# Patient Record
Sex: MALE | Race: WHITE | Employment: FULL TIME | URBAN - METROPOLITAN AREA
[De-identification: names, ages, dates, MRNs, and addresses within clinical notes are randomized per-mention and may not be internally consistent; named-entity substitution may affect disease eponyms.]

---

## 2019-07-20 ENCOUNTER — APPOINTMENT (OUTPATIENT)
Dept: GENERAL RADIOLOGY | Age: 24
End: 2019-07-20
Payer: COMMERCIAL

## 2019-07-20 ENCOUNTER — HOSPITAL ENCOUNTER (EMERGENCY)
Age: 24
Discharge: HOME | End: 2019-07-21
Attending: EMERGENCY MEDICINE
Payer: COMMERCIAL

## 2019-07-20 ENCOUNTER — APPOINTMENT (OUTPATIENT)
Dept: CT IMAGING | Age: 24
End: 2019-07-20
Payer: COMMERCIAL

## 2019-07-20 VITALS
TEMPERATURE: 100.3 F | HEART RATE: 68 BPM | HEIGHT: 72 IN | RESPIRATION RATE: 18 BRPM | DIASTOLIC BLOOD PRESSURE: 71 MMHG | SYSTOLIC BLOOD PRESSURE: 133 MMHG | WEIGHT: 246.6 LBS | OXYGEN SATURATION: 96 % | BODY MASS INDEX: 33.4 KG/M2

## 2019-07-20 DIAGNOSIS — S09.90XA INJURY OF HEAD, INITIAL ENCOUNTER: Primary | ICD-10-CM

## 2019-07-20 PROCEDURE — 72040 X-RAY EXAM NECK SPINE 2-3 VW: CPT

## 2019-07-20 PROCEDURE — 99283 EMERGENCY DEPT VISIT LOW MDM: CPT

## 2019-07-20 ASSESSMENT — PAIN DESCRIPTION - ONSET: ONSET: ON-GOING

## 2019-07-20 ASSESSMENT — PAIN DESCRIPTION - LOCATION: LOCATION: HEAD

## 2019-07-20 ASSESSMENT — PAIN DESCRIPTION - DESCRIPTORS: DESCRIPTORS: HEADACHE

## 2019-07-20 ASSESSMENT — PAIN DESCRIPTION - PROGRESSION: CLINICAL_PROGRESSION: NOT CHANGED

## 2019-07-20 ASSESSMENT — PAIN SCALES - GENERAL: PAINLEVEL_OUTOF10: 2

## 2019-07-20 ASSESSMENT — PAIN DESCRIPTION - FREQUENCY: FREQUENCY: CONTINUOUS

## 2019-07-20 ASSESSMENT — PAIN DESCRIPTION - PAIN TYPE: TYPE: ACUTE PAIN

## 2019-07-21 ENCOUNTER — APPOINTMENT (OUTPATIENT)
Dept: CT IMAGING | Age: 24
End: 2019-07-21
Payer: COMMERCIAL

## 2019-07-21 PROCEDURE — 70450 CT HEAD/BRAIN W/O DYE: CPT

## 2019-07-21 RX ORDER — ONDANSETRON 4 MG/1
4 TABLET, ORALLY DISINTEGRATING ORAL EVERY 8 HOURS PRN
Qty: 20 TABLET | Refills: 0 | Status: SHIPPED | OUTPATIENT
Start: 2019-07-21

## 2019-07-21 ASSESSMENT — ENCOUNTER SYMPTOMS
TROUBLE SWALLOWING: 0
EYE PAIN: 0
BACK PAIN: 0
COLOR CHANGE: 0
VOMITING: 0
SORE THROAT: 0
ABDOMINAL PAIN: 0
NAUSEA: 1
SHORTNESS OF BREATH: 0
FACIAL SWELLING: 0
VOICE CHANGE: 0
PHOTOPHOBIA: 0

## 2019-07-21 NOTE — ED PROVIDER NOTES
[facility] [address] ED  eMERGENCY dEPARTMENT eNCOUnter      Pt Name: Liam Reyes  MRN: [id number]  [place] 1995  Date of evaluation: 7/20/2019  Provider: FLORINA Saenz CNP    CHIEF COMPLAINT       Chief Complaint   Patient presents with    Loss of Consciousness     pt states he fell backwards during a hockey game warmup & hit his head on the ground tonight. Pt states he was told he was \"out\" for about 5 seconds.  Head Injury    Nausea    Dizziness    Headache         HISTORY OF PRESENT ILLNESS  (Location/Symptom, Timing/Onset, Context/Setting, Quality, Duration, Modifying Factors, Severity.)   Liam Reyes is a 24 y.o. male who presents to the emergency department via private auto for a head injury. Reports he ran into his friend while warming up for a hockey game. He fell backwards and struck his head. He lost consciousness for a few seconds. Reports dizziness, nausea. He was wearing a helmet. Denies pain to his neck, back, chest, abdomen. Denies weakness, N/T to his extremities, vomiting, vision changes. He is accompanied by family/friend. Rates his pain 2/10 at this time. Nursing Notes were reviewed. ALLERGIES     Patient has no known allergies. CURRENT MEDICATIONS       Previous Medications    No medications on file       PAST MEDICAL HISTORY   No past medical history on file. SURGICAL HISTORY     No past surgical history on file. FAMILY HISTORY     No family history on file. No family status information on file. SOCIAL HISTORY          REVIEW OF SYSTEMS    (2-9 systems for level 4, 10 or more for level 5)     Review of Systems   Constitutional: Negative for chills, diaphoresis, fatigue and fever. HENT: Negative for dental problem, drooling, ear discharge, ear pain, facial swelling, nosebleeds, sore throat, trouble swallowing and voice change. Eyes: Negative for photophobia, pain and visual disturbance.    Respiratory: Negative for shortness of breath. Cardiovascular: Negative for chest pain. Gastrointestinal: Positive for nausea. Negative for abdominal pain and vomiting. Musculoskeletal: Negative for arthralgias, back pain, gait problem, neck pain and neck stiffness. Skin: Negative for color change, rash and wound. Neurological: Positive for syncope and headaches. Negative for dizziness, facial asymmetry, speech difficulty, weakness, light-headedness and numbness. Psychiatric/Behavioral: Negative for confusion. Except as noted above the remainder of the review of systems was reviewed and negative. PHYSICAL EXAM    (up to 7 for level 4, 8 or more for level 5)     ED Triage Vitals [07/20/19 2314]   BP Temp Temp Source Pulse Resp SpO2 Height Weight   133/71 100.3 °F (37.9 °C) Oral 68 18 96 % 6' (1.829 m) 246 lb 9.6 oz (111.9 kg)     Physical Exam   Constitutional: He is oriented to person, place, and time. He appears well-developed and well-nourished. No distress. HENT:   Head: Atraumatic. Head is without raccoon's eyes and without Neves's sign. Right Ear: External ear normal. No drainage. Left Ear: External ear normal. No drainage. Nose: No sinus tenderness. No epistaxis. Mouth/Throat: Oropharynx is clear and moist.   Eyes: Pupils are equal, round, and reactive to light. Conjunctivae and EOM are normal.   Cardiovascular: Normal rate, regular rhythm and normal heart sounds. Pulmonary/Chest: Effort normal and breath sounds normal. No respiratory distress. He has no wheezes. He has no rales. Musculoskeletal:        Cervical back: He exhibits no tenderness, no bony tenderness and no pain. Thoracic back: He exhibits no tenderness, no bony tenderness and no pain. Lumbar back: He exhibits no tenderness, no bony tenderness and no pain. Neurological: He is alert and oriented to person, place, and time. He has normal strength. No cranial nerve deficit. Coordination and gait normal. GCS eye subscore is 4.  GCS verbal

## 2020-02-01 ENCOUNTER — HOSPITAL ENCOUNTER (EMERGENCY)
Age: 25
Discharge: HOME | End: 2020-02-01
Attending: EMERGENCY MEDICINE
Payer: COMMERCIAL

## 2020-02-01 ENCOUNTER — APPOINTMENT (OUTPATIENT)
Dept: GENERAL RADIOLOGY | Age: 25
End: 2020-02-01
Payer: COMMERCIAL

## 2020-02-01 VITALS
DIASTOLIC BLOOD PRESSURE: 87 MMHG | RESPIRATION RATE: 16 BRPM | HEART RATE: 57 BPM | BODY MASS INDEX: 32.51 KG/M2 | WEIGHT: 240 LBS | OXYGEN SATURATION: 97 % | TEMPERATURE: 97.7 F | HEIGHT: 72 IN | SYSTOLIC BLOOD PRESSURE: 151 MMHG

## 2020-02-01 PROCEDURE — 71046 X-RAY EXAM CHEST 2 VIEWS: CPT

## 2020-02-01 PROCEDURE — 99283 EMERGENCY DEPT VISIT LOW MDM: CPT

## 2020-02-01 RX ORDER — BENZONATATE 100 MG/1
100 CAPSULE ORAL 3 TIMES DAILY PRN
Qty: 21 CAPSULE | Refills: 0 | Status: SHIPPED | OUTPATIENT
Start: 2020-02-01 | End: 2020-02-08

## 2020-02-01 RX ORDER — PREDNISONE 50 MG/1
50 TABLET ORAL ONCE
Qty: 3 TABLET | Refills: 0 | Status: SHIPPED | OUTPATIENT
Start: 2020-02-01 | End: 2020-02-01

## 2020-02-01 ASSESSMENT — PAIN DESCRIPTION - DESCRIPTORS: DESCRIPTORS: BURNING

## 2020-02-01 ASSESSMENT — PAIN SCALES - GENERAL: PAINLEVEL_OUTOF10: 1

## 2020-02-01 ASSESSMENT — PAIN DESCRIPTION - LOCATION: LOCATION: CHEST

## 2020-02-05 NOTE — ED PROVIDER NOTES
eMERGENCY dEPARTMENT eNCOUnter   Independent Attestation     Pt Name: Argelia Galvez  MRN: [id number]  [place] 1995  Date of evaluation: 2/5/20     Argelia Galvez is a 21 y.o. male with CC: Cough (recently dx with bronchitis, on steroids and azithromycin)      Based on the medical record the care appears appropriate. I was personally available for consultation in the Emergency Department.     Cristina Couch MD  Attending Emergency Physician                    Cristina Couch MD  02/05/20 0306
97%   Weight: 240 lb (108.9 kg)    Height: 6' (1.829 m)      Chest x-ray is negative. Blood pressure was elevated initially and it improved spontaneously. No pneumonia. We will give high-dose steroids were 3 days and discharged home with Tessalon. Patient agreeable plan of care. Pre-hypertension/Hypertension: The patient has been informed that they may have pre-hypertension or Hypertension based on a blood pressure reading in the emergency department. I recommend that the patient call the primary care provider listed on their discharge instructions or a physician of their choice this week to arrange follow up for further evaluation of possible pre-hypertension or Hypertension. Perc criteria negative. CONSULTS:  None    PROCEDURES:  Procedures        FINAL IMPRESSION      1.  Cough    2. Elevated blood pressure reading          DISPOSITION/PLAN   DISPOSITION Decision To Discharge 02/01/2020 11:36:59 PM      PATIENTREFERRED TO:   Valdo Avina MD  [address]     In 3 days        DISCHARGE MEDICATIONS:     Discharge Medication List as of 2/1/2020 11:41 PM      START taking these medications    Details   benzonatate (TESSALON PERLES) 100 MG capsule Take 1 capsule by mouth 3 times daily as needed for Cough, Disp-21 capsule, R-0Print      predniSONE (DELTASONE) 50 MG tablet Take 1 tablet by mouth once for 1 dose, Disp-3 tablet, R-0Print                 (Please note that portions of this note were completed with a voice recognition program.  Efforts were made to edit thedictations but occasionally words are mis-transcribed.)    LINDSEY Koehler PA-C  02/02/20 0386

## 2021-01-05 ENCOUNTER — HOSPITAL ENCOUNTER (OUTPATIENT)
Age: 26
Discharge: HOME | End: 2021-01-05

## 2021-01-05 LAB — RUBV IGG SER QL: 82.8 IU/ML

## 2021-01-05 PROCEDURE — 86481 TB AG RESPONSE T-CELL SUSP: CPT

## 2021-01-05 PROCEDURE — 86735 MUMPS ANTIBODY: CPT

## 2021-01-05 PROCEDURE — 86762 RUBELLA ANTIBODY: CPT

## 2021-01-05 PROCEDURE — 86787 VARICELLA-ZOSTER ANTIBODY: CPT

## 2021-01-05 PROCEDURE — 86765 RUBEOLA ANTIBODY: CPT

## 2021-01-06 LAB
MEASLES ANTIBODY IGG: 3.19
MUV IGG SER QL: 3.52
VZV IGG SER QL IA: 3.21

## 2021-01-08 LAB — T-SPOT TB TEST: NORMAL
